# Patient Record
Sex: MALE | Race: WHITE | NOT HISPANIC OR LATINO | Employment: FULL TIME | URBAN - METROPOLITAN AREA
[De-identification: names, ages, dates, MRNs, and addresses within clinical notes are randomized per-mention and may not be internally consistent; named-entity substitution may affect disease eponyms.]

---

## 2017-06-16 ENCOUNTER — TREATMENT (OUTPATIENT)
Dept: PHYSICAL THERAPY | Facility: CLINIC | Age: 37
End: 2017-06-16

## 2017-06-16 DIAGNOSIS — Z02.1 PRE-EMPLOYMENT EXAMINATION: Primary | ICD-10-CM

## 2017-06-16 PROCEDURE — PDS: Performed by: PHYSICAL THERAPIST

## 2017-10-23 ENCOUNTER — LAB REQUISITION (OUTPATIENT)
Dept: LAB | Facility: OTHER | Age: 37
End: 2017-10-23

## 2017-10-23 DIAGNOSIS — Z00.00 ANNUAL PHYSICAL EXAM: ICD-10-CM

## 2017-10-23 PROCEDURE — 86481 TB AG RESPONSE T-CELL SUSP: CPT | Performed by: PHYSICAL MEDICINE & REHABILITATION

## 2017-10-25 LAB
TSPOT INTERPRETATION: NEGATIVE
TSPOT NIL CONTROL: 0
TSPOT PANEL A: 0
TSPOT PANEL B: 0
TSPOT POS CONTROL: 201

## 2019-02-28 ENCOUNTER — APPOINTMENT (OUTPATIENT)
Dept: WOMENS IMAGING | Facility: HOSPITAL | Age: 39
End: 2019-02-28

## 2019-02-28 PROCEDURE — 71046 X-RAY EXAM CHEST 2 VIEWS: CPT | Performed by: RADIOLOGY

## 2020-01-24 ENCOUNTER — APPOINTMENT (OUTPATIENT)
Dept: WOMENS IMAGING | Facility: HOSPITAL | Age: 40
End: 2020-01-24

## 2020-01-24 PROCEDURE — 71046 X-RAY EXAM CHEST 2 VIEWS: CPT | Performed by: RADIOLOGY

## 2021-02-15 ENCOUNTER — APPOINTMENT (OUTPATIENT)
Dept: VACCINE CLINIC | Facility: HOSPITAL | Age: 41
End: 2021-02-15

## 2021-07-13 ENCOUNTER — APPOINTMENT (OUTPATIENT)
Dept: WOMENS IMAGING | Facility: HOSPITAL | Age: 41
End: 2021-07-13

## 2021-07-13 PROCEDURE — 71046 X-RAY EXAM CHEST 2 VIEWS: CPT | Performed by: RADIOLOGY

## 2023-05-23 ENCOUNTER — OFFICE VISIT (OUTPATIENT)
Dept: INTERNAL MEDICINE | Facility: CLINIC | Age: 43
End: 2023-05-23
Payer: COMMERCIAL

## 2023-05-23 ENCOUNTER — OFFICE VISIT (OUTPATIENT)
Dept: SURGERY | Facility: CLINIC | Age: 43
End: 2023-05-23
Payer: COMMERCIAL

## 2023-05-23 VITALS
DIASTOLIC BLOOD PRESSURE: 78 MMHG | BODY MASS INDEX: 29.72 KG/M2 | SYSTOLIC BLOOD PRESSURE: 132 MMHG | HEIGHT: 75 IN | WEIGHT: 239 LBS

## 2023-05-23 VITALS
OXYGEN SATURATION: 99 % | SYSTOLIC BLOOD PRESSURE: 132 MMHG | DIASTOLIC BLOOD PRESSURE: 78 MMHG | TEMPERATURE: 98.2 F | HEART RATE: 61 BPM | WEIGHT: 239.4 LBS

## 2023-05-23 DIAGNOSIS — L72.3 INFECTED SEBACEOUS CYST: Primary | ICD-10-CM

## 2023-05-23 DIAGNOSIS — L08.9 INFECTED SEBACEOUS CYST: Primary | ICD-10-CM

## 2023-05-23 DIAGNOSIS — L02.212 ABSCESS OF BACK: Primary | ICD-10-CM

## 2023-05-23 PROCEDURE — 87205 SMEAR GRAM STAIN: CPT | Performed by: SURGERY

## 2023-05-23 PROCEDURE — 87070 CULTURE OTHR SPECIMN AEROBIC: CPT | Performed by: SURGERY

## 2023-05-23 RX ORDER — SULFAMETHOXAZOLE AND TRIMETHOPRIM 800; 160 MG/1; MG/1
1 TABLET ORAL 2 TIMES DAILY
Qty: 20 TABLET | Refills: 0 | Status: SHIPPED | OUTPATIENT
Start: 2023-05-23 | End: 2023-06-02

## 2023-05-23 NOTE — PROGRESS NOTES
Subjective   Celestine Musa is a 43 y.o. male who presents to the office in surgical consultation from Makenzie Miles APRN for an infected sebaceous cyst.    History of Present Illness     The patient has a longstanding cyst of his mid back that has recently gotten larger and increasingly symptomatic.  He has pain especially when pressure is applied.  He has also had intermittent mild drainage but it has not successfully decompressed.  There is redness of the skin.  He is not having any fevers nor chills.    Review of Systems   Constitutional: Negative for fatigue and fever.   Respiratory: Negative for chest tightness and shortness of breath.    Cardiovascular: Negative for chest pain and palpitations.   Gastrointestinal: Negative for abdominal pain, blood in stool, constipation, diarrhea, nausea and vomiting.     History reviewed. No pertinent past medical history.  History reviewed. No pertinent surgical history.  History reviewed. No pertinent family history.  Social History     Socioeconomic History   • Marital status:    Tobacco Use   • Smoking status: Never   • Smokeless tobacco: Never   Vaping Use   • Vaping Use: Never used   Substance and Sexual Activity   • Alcohol use: Not Currently   • Drug use: Never   • Sexual activity: Defer       Objective   Physical Exam  Constitutional:       Appearance: Normal appearance. He is well-developed. He is not toxic-appearing.   Eyes:      General: No scleral icterus.  Pulmonary:      Effort: Pulmonary effort is normal. No respiratory distress.   Skin:     General: Skin is warm and dry.      Comments: There is a 2-3 cm abscess of the mid back with overlying erythema and palpable fluctuance.   Neurological:      Mental Status: He is alert and oriented to person, place, and time.   Psychiatric:         Behavior: Behavior normal.         Thought Content: Thought content normal.         Judgment: Judgment normal.     Procedure  The abscess of the mid back was  prepped and draped in the standard surgical fashion.  It was infiltrated with 1% lidocaine without epinephrine.  An incision was made with a scalpel carried through the skin into the abscess cavity.  Infected sebum was encountered.  Cultures were taken.  The cavity was completely evacuated and irrigated with the local anesthetic.  The wall of the sebaceous cyst was visible and this was completely excised sharply with scissors.  The wound was then packed with iodoform gauze.  A dressing was applied.  The patient tolerated the procedure well.    Assessment & Plan     1.  Infected sebaceous cyst: The patient has a longstanding sebaceous cyst of the back that has now become infected.  An incision and drainage of the abscess was performed in the office.  Local wound care was discussed with him.  He will follow-up on an as-needed basis.

## 2023-05-23 NOTE — LETTER
May 23, 2023     PAUL Wilkins     Patient: Celestine Musa   YOB: 1980   Date of Visit: 5/23/2023       Dear Dr. Park Recipients:    Thank you for referring Celestine Musa to me for evaluation. Below are the relevant portions of my assessment and plan of care.    If you have questions, please do not hesitate to call me. I look forward to following Celestine along with you.         Sincerely,        Yong Chauhan Jr., MD        CC:   No Recipients    Yong Chauhan Jr., MD  05/23/23 1311  Signed  Subjective   Celestine Musa is a 43 y.o. male who presents to the office in surgical consultation from Makenzie Miles APRN for an infected sebaceous cyst.    History of Present Illness     The patient has a longstanding cyst of his mid back that has recently gotten larger and increasingly symptomatic.  He has pain especially when pressure is applied.  He has also had intermittent mild drainage but it has not successfully decompressed.  There is redness of the skin.  He is not having any fevers nor chills.    Review of Systems   Constitutional: Negative for fatigue and fever.   Respiratory: Negative for chest tightness and shortness of breath.    Cardiovascular: Negative for chest pain and palpitations.   Gastrointestinal: Negative for abdominal pain, blood in stool, constipation, diarrhea, nausea and vomiting.     History reviewed. No pertinent past medical history.  History reviewed. No pertinent surgical history.  History reviewed. No pertinent family history.  Social History     Socioeconomic History   • Marital status:    Tobacco Use   • Smoking status: Never   • Smokeless tobacco: Never   Vaping Use   • Vaping Use: Never used   Substance and Sexual Activity   • Alcohol use: Not Currently   • Drug use: Never   • Sexual activity: Defer       Objective   Physical Exam  Constitutional:       Appearance: Normal appearance. He is well-developed. He is not toxic-appearing.    Eyes:      General: No scleral icterus.  Pulmonary:      Effort: Pulmonary effort is normal. No respiratory distress.   Skin:     General: Skin is warm and dry.      Comments: There is a 2-3 cm abscess of the mid back with overlying erythema and palpable fluctuance.   Neurological:      Mental Status: He is alert and oriented to person, place, and time.   Psychiatric:         Behavior: Behavior normal.         Thought Content: Thought content normal.         Judgment: Judgment normal.     Procedure  The abscess of the mid back was prepped and draped in the standard surgical fashion.  It was infiltrated with 1% lidocaine without epinephrine.  An incision was made with a scalpel carried through the skin into the abscess cavity.  Infected sebum was encountered.  Cultures were taken.  The cavity was completely evacuated and irrigated with the local anesthetic.  The wall of the sebaceous cyst was visible and this was completely excised sharply with scissors.  The wound was then packed with iodoform gauze.  A dressing was applied.  The patient tolerated the procedure well.    Assessment & Plan     1.  Infected sebaceous cyst: The patient has a longstanding sebaceous cyst of the back that has now become infected.  An incision and drainage of the abscess was performed in the office.  Local wound care was discussed with him.  He will follow-up on an as-needed basis.

## 2023-05-23 NOTE — PROGRESS NOTES
Chief Complaint  Cyst    Subjective        Celestine Musa presents to [facility] due to cyst on back.    History of Present Illness  He presents due to an erythematous cyst on his midback which has been present for many years, has recently increased in size and tenderness. Denies fever and/or chills. No drainage from lesion.      Objective   Vital Signs:  /78 (BP Location: Left arm, Patient Position: Sitting, Cuff Size: Adult)   Pulse 61   Temp 98.2 °F (36.8 °C) (Temporal)   Wt 109 kg (239 lb 6.4 oz)   SpO2 99%   There is no height or weight on file to calculate BMI.             Physical Exam  Constitutional:       Appearance: He is well-developed. He is not ill-appearing.   HENT:      Head: Normocephalic.      Right Ear: Hearing, tympanic membrane and external ear normal.      Left Ear: Hearing, tympanic membrane and external ear normal.      Nose: Nose normal. No nasal deformity, mucosal edema or rhinorrhea.      Right Sinus: No maxillary sinus tenderness or frontal sinus tenderness.      Left Sinus: No maxillary sinus tenderness or frontal sinus tenderness.      Mouth/Throat:      Dentition: Normal dentition.   Eyes:      General: Lids are normal.         Right eye: No discharge.         Left eye: No discharge.      Conjunctiva/sclera: Conjunctivae normal.      Right eye: No exudate.     Left eye: No exudate.  Neck:      Thyroid: No thyroid mass or thyromegaly.      Vascular: No carotid bruit.      Trachea: Trachea normal.   Cardiovascular:      Rate and Rhythm: Regular rhythm.      Pulses: Normal pulses.      Heart sounds: Normal heart sounds. No murmur heard.  Pulmonary:      Effort: No respiratory distress.      Breath sounds: Normal breath sounds. No decreased breath sounds, wheezing, rhonchi or rales.   Abdominal:      General: Bowel sounds are normal.      Palpations: Abdomen is soft.      Tenderness: There is no abdominal tenderness.   Musculoskeletal:       Cervical back: Normal range of motion. No edema.   Lymphadenopathy:      Head:      Right side of head: No submental, submandibular, tonsillar, preauricular, posterior auricular or occipital adenopathy.      Left side of head: No submental, submandibular, tonsillar, preauricular, posterior auricular or occipital adenopathy.   Skin:     General: Skin is warm and dry.      Nails: There is no clubbing.      Comments: Large abscess with erythema and induration, +warmth   Neurological:      Mental Status: He is alert.   Psychiatric:         Behavior: Behavior is cooperative.        Result Review :  The following data was reviewed by: PAUL Baez on 05/23/2023:        Labs from 5/22/23  PE reviewed with normal kidney function-Rx for Bactrim prescribed.           Assessment and Plan   Diagnoses and all orders for this visit:    1. Abscess of back (Primary)  -     sulfamethoxazole-trimethoprim (BACTRIM DS,SEPTRA DS) 800-160 MG per tablet; Take 1 tablet by mouth 2 (Two) Times a Day for 10 days.  Dispense: 20 tablet; Refill: 0  -     Ambulatory Referral to General Surgery    He will continue to apply warm compresses but also begin antibiotics due to abscess. Will refer to surgery due to size of abscess.         Follow Up   Return if symptoms worsen or fail to improve, for Next scheduled follow up.  Patient was given instructions and counseling regarding his condition or for health maintenance advice. Please see specific information pulled into the AVS if appropriate.

## 2023-05-26 LAB
BACTERIA SPEC AEROBE CULT: NORMAL
GRAM STN SPEC: NORMAL

## 2023-09-25 ENCOUNTER — HOSPITAL ENCOUNTER (OUTPATIENT)
Facility: HOSPITAL | Age: 43
Discharge: HOME | End: 2023-09-25

## 2023-09-25 ENCOUNTER — TRANSCRIBE ORDERS (OUTPATIENT)
Facility: HOSPITAL | Age: 43
End: 2023-09-25

## 2023-09-25 DIAGNOSIS — Z00.00 ANNUAL PHYSICAL EXAM: ICD-10-CM

## 2023-09-25 DIAGNOSIS — Z00.00 ANNUAL PHYSICAL EXAM: Primary | ICD-10-CM

## 2023-09-25 PROCEDURE — 71046 X-RAY EXAM CHEST 2 VIEWS: CPT
